# Patient Record
Sex: FEMALE
[De-identification: names, ages, dates, MRNs, and addresses within clinical notes are randomized per-mention and may not be internally consistent; named-entity substitution may affect disease eponyms.]

---

## 2023-03-09 ENCOUNTER — NURSE TRIAGE (OUTPATIENT)
Dept: OTHER | Facility: CLINIC | Age: 23
End: 2023-03-09

## 2023-03-09 NOTE — TELEPHONE ENCOUNTER
Location of patient: [place]    Subjective: Caller states Gallinal issues with picking up medication\"     Current Symptoms: Having issues with pharmacy. They tell her she is supposed to use mail order pharmacy    Care advice provided, patient verbalizes understanding; denies any other questions or concerns; instructed to call back for any new or worsening symptoms. Call transferred to [address]    This triage is a result of a call to [facility]. Please do not respond to the triage nurse through this encounter. Any subsequent communication should be directly with the patient. Reason for Disposition   General information question, no triage required and triager able to answer question    Protocols used:  Information Only Call - No Triage-ADULT-